# Patient Record
Sex: MALE | URBAN - NONMETROPOLITAN AREA
[De-identification: names, ages, dates, MRNs, and addresses within clinical notes are randomized per-mention and may not be internally consistent; named-entity substitution may affect disease eponyms.]

---

## 2024-12-02 ENCOUNTER — APPOINTMENT (OUTPATIENT)
Dept: CARDIOLOGY | Facility: CLINIC | Age: 69
End: 2024-12-02
Payer: OTHER GOVERNMENT

## 2024-12-02 VITALS — SYSTOLIC BLOOD PRESSURE: 122 MMHG | HEART RATE: 99 BPM | DIASTOLIC BLOOD PRESSURE: 82 MMHG | HEIGHT: 67 IN

## 2024-12-02 DIAGNOSIS — F17.200 CURRENT SMOKER: ICD-10-CM

## 2024-12-02 DIAGNOSIS — I48.21 PERMANENT ATRIAL FIBRILLATION (MULTI): Primary | ICD-10-CM

## 2024-12-02 DIAGNOSIS — R60.9 EDEMA, UNSPECIFIED TYPE: ICD-10-CM

## 2024-12-02 DIAGNOSIS — Z79.01 LONG TERM CURRENT USE OF ANTICOAGULANT THERAPY: ICD-10-CM

## 2024-12-02 DIAGNOSIS — Z78.9 NURSING HOME RESIDENT: ICD-10-CM

## 2024-12-02 DIAGNOSIS — I63.9 CEREBROVASCULAR ACCIDENT (CVA), UNSPECIFIED MECHANISM (MULTI): ICD-10-CM

## 2024-12-02 DIAGNOSIS — E78.2 MIXED HYPERLIPIDEMIA: ICD-10-CM

## 2024-12-02 PROBLEM — B19.20 HEPATITIS C: Status: ACTIVE | Noted: 2024-12-02

## 2024-12-02 PROBLEM — I27.20 PULMONARY HYPERTENSION (MULTI): Status: ACTIVE | Noted: 2024-12-02

## 2024-12-02 PROBLEM — D69.6 THROMBOCYTOPENIA (CMS-HCC): Status: RESOLVED | Noted: 2024-12-02 | Resolved: 2024-12-02

## 2024-12-02 PROBLEM — D69.6 THROMBOCYTOPENIA (CMS-HCC): Status: ACTIVE | Noted: 2024-12-02

## 2024-12-02 PROCEDURE — 93000 ELECTROCARDIOGRAM COMPLETE: CPT | Performed by: INTERNAL MEDICINE

## 2024-12-02 PROCEDURE — 1159F MED LIST DOCD IN RCRD: CPT | Performed by: INTERNAL MEDICINE

## 2024-12-02 PROCEDURE — 99205 OFFICE O/P NEW HI 60 MIN: CPT | Performed by: INTERNAL MEDICINE

## 2024-12-02 RX ORDER — TRIAMTERENE AND HYDROCHLOROTHIAZIDE 37.5; 25 MG/1; MG/1
1 CAPSULE ORAL EVERY MORNING
Qty: 90 CAPSULE | Refills: 2 | Status: SHIPPED | OUTPATIENT
Start: 2024-12-02

## 2024-12-02 RX ORDER — LEVETIRACETAM 500 MG/1
500 TABLET ORAL 2 TIMES DAILY
COMMUNITY

## 2024-12-02 RX ORDER — ATORVASTATIN CALCIUM 40 MG/1
40 TABLET, FILM COATED ORAL DAILY
COMMUNITY

## 2024-12-02 RX ORDER — SENNOSIDES 8.6 MG/1
1 TABLET ORAL DAILY
COMMUNITY

## 2024-12-02 RX ORDER — CHOLECALCIFEROL (VITAMIN D3) 25 MCG
1000 TABLET ORAL DAILY
COMMUNITY

## 2024-12-02 RX ORDER — ASPIRIN 81 MG/1
81 TABLET ORAL DAILY
COMMUNITY

## 2024-12-02 RX ORDER — GABAPENTIN 300 MG/1
300 CAPSULE ORAL 2 TIMES DAILY
COMMUNITY

## 2024-12-02 RX ORDER — DEXTROMETHORPHAN HYDROBROMIDE, GUAIFENESIN 5; 100 MG/5ML; MG/5ML
650 LIQUID ORAL EVERY 8 HOURS PRN
COMMUNITY

## 2024-12-02 RX ORDER — B-COMPLEX WITH VITAMIN C
1 TABLET ORAL DAILY
COMMUNITY

## 2024-12-02 RX ORDER — METOPROLOL SUCCINATE 25 MG/1
25 TABLET, EXTENDED RELEASE ORAL DAILY
COMMUNITY

## 2024-12-02 RX ORDER — SILVER SULFADIAZINE 10 G/1000G
CREAM TOPICAL DAILY
COMMUNITY

## 2024-12-02 NOTE — PATIENT INSTRUCTIONS
Please bring all medicines, vitamins, and herbal supplements with you when you come to the office.    Prescriptions will not be filled unless you are compliant with your follow up appointments or have a follow up appointment scheduled as per instruction of your physician. Refills should be requested at the time of your visit.     Matt discussed  Follow up 6months  Kim  Chem 6

## 2024-12-02 NOTE — PROGRESS NOTES
Cardiology Consultation- New Consult    Reason for referral: Atrial fibrillation and edema    HPI: Jacob Peterson is a 69 y.o. male resident of the [facility] was sent for evaluation for atrial fibrillation and lower extremity edema.  No record was sent but I was able to retrieve at the review of record from [facility].  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  He was diagnosed with atrial fibrillation about a year ago.  He was placed on anticoagulation with Eliquis.  The patient is almost wheelchair-bound.  He had dense right-sided hemiplegia.  He has noted edema but mainly him in the right lower extremity.  He denies chest pain.  He is very inactive.  He is bothered by easy bruisability and superficial bruises.  He denies chest pain or palpitation.  He continues to smoke.  There has been no effort to restore sinus mechanism.  The patient had been in atrial fibrillation for almost a year but this is completely asymptomatic.    Assessment    1.  Permanent atrial fibrillation for more than 1 year based on my review of the record from [facility].  Decision was made in the past for heart rate control and long-term anticoagulation considering he meet the criteria for lifelong anticoagulation  2.  Easy bruisability due to anticoagulation  3.  Edema I suspect gravitational due to lack of activity and a prior history of stroke more pronounced on the right side  4.  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  Wheelchair-bound  6.  Active tobacco use  7.  High risk for ischemic heart disease no previous workup was done  8.  Hyperlipidemia on treatment  9.  Long-term anticoagulation  10.  Echocardiogram at [facility] last year showed normal LV systolic function and moderate degree of pulmonary hypertension which I suspect due to his lung disease and COPD  Plan    1.  I reviewed with patient and his wife treatment option at [facility]  length.  The patient was entertaining the idea of quit taking the Eliquis but I told him that his risk for recurrent stroke exceed 10% and I told him it would be in his best interest either to stay on Eliquis or consider Watchman device.  Following extensive and lengthy discussion the patient elected to remain on Eliquis.  Risk, benefit and alternative reviewed with the patient at length  2.  His previous echocardiogram showed normal LVEF with moderate degree of pulmonary hypertension with PA pressure around 47  3.  I reviewed all his previous record from [facility]  4.  I advised him to try Dyazide 1 tablet daily to address his edema  5.  Advised him to exercise and try to move his extremity and to keep them elevated when he sits down  6.  I advised him to notify with any change in his cardiac status or symptoms  7.  The above reviewed with the patient and his wife at great length      Past Medical History:   As detailed above    Surgical History:   He has a past surgical history that includes XR shoulder.    Family History:   No family history on file.    Social History:   Social History     Tobacco Use    Smoking status: Every Day     Types: Cigarettes    Smokeless tobacco: Never   Substance Use Topics    Alcohol use: Yes        Allergies:  Patient has no known allergies.     Current Medications:    Current Outpatient Medications:     acetaminophen (Tylenol 8 HOUR) 650 mg ER tablet, Take 1 tablet (650 mg) by mouth every 8 hours if needed for mild pain (1 - 3). Do not crush, chew, or split., Disp: , Rfl:     apixaban (Eliquis) 5 mg tablet, Take 1 tablet (5 mg) by mouth 2 times a day., Disp: , Rfl:     aspirin 81 mg EC tablet, Take 1 tablet (81 mg) by mouth once daily., Disp: , Rfl:     atorvastatin (Lipitor) 40 mg tablet, Take 1 tablet (40 mg) by mouth once daily., Disp: , Rfl:     B complex-vitamin C tablet, Take 1 tablet by mouth once daily., Disp: , Rfl:     cholecalciferol (Vitamin D3) 25 MCG (1000  "UT) tablet, Take 1 tablet (1,000 Units) by mouth once daily., Disp: , Rfl:     gabapentin (Neurontin) 300 mg capsule, Take 1 capsule (300 mg) by mouth 2 times a day., Disp: , Rfl:     levETIRAcetam (Keppra) 500 mg tablet, Take 1 tablet (500 mg) by mouth 2 times a day., Disp: , Rfl:     metoprolol succinate XL (Toprol-XL) 25 mg 24 hr tablet, Take 1 tablet (25 mg) by mouth once daily. Do not crush or chew., Disp: , Rfl:     sennosides (Senokot) 8.6 mg tablet, Take 1 tablet (8.6 mg) by mouth once daily., Disp: , Rfl:     silver sulfADIAZINE (Silvadene) 1 % cream, Apply topically once daily., Disp: , Rfl:     triamterene-hydrochlorothiazid (Dyazide) 37.5-25 mg capsule, Take 1 capsule by mouth once daily in the morning., Disp: 90 capsule, Rfl: 2     Vitals:  Vitals:    24 0953 24 0956   BP: 124/72 122/82   BP Location: Left arm Right arm   Patient Position: Sitting Sitting   Pulse: 99    Height: 1.702 m (5' 7\")       EKG done in office today     Review of Systems   All other systems reviewed and are negative.      Objective         Physical Exam  Constitutional:       Appearance: Normal appearance.   HENT:      Nose: Nose normal.   Neck:      Vascular: No carotid bruit.   Cardiovascular:      Rate and Rhythm: Normal rate. Rhythm irregularly irregular.      Pulses: Normal pulses.      Heart sounds: Normal heart sounds.   Pulmonary:      Effort: Pulmonary effort is normal.   Abdominal:      General: Bowel sounds are normal.      Palpations: Abdomen is soft.   Musculoskeletal:         General: Normal range of motion.      Cervical back: Normal range of motion.      Right lower le+ Edema present.      Left lower le+ Edema present.   Skin:     General: Skin is warm and dry.   Neurological:      General: No focal deficit present.      Mental Status: He is alert.   Psychiatric:         Mood and Affect: Mood normal.         Behavior: Behavior normal.         Thought Content: Thought content normal.         " Judgment: Judgment normal.                Assessment and Plan:   1. Permanent atrial fibrillation (Multi)  ECG 12 Lead    Follow Up In Cardiology      2. Long term current use of anticoagulant therapy        3. Edema, unspecified type  triamterene-hydrochlorothiazid (Dyazide) 37.5-25 mg capsule    Basic Metabolic Panel    Basic Metabolic Panel      4. Mixed hyperlipidemia        5. Current smoker        6. Cerebrovascular accident (CVA), unspecified mechanism (Multi)        7. Nursing home resident               Scribe Attestation  By signing my name below, Monica TENORIO LPN, Scribe   attest that this documentation has been prepared under the direction and in the presence of Stephon Prabhakar MD.    Provider Attestation - Scribe documentation    All medical record entries made by the Scribe were at my direction and personally dictated by me. I have reviewed the chart and agree that the record accurately reflects my personal performance of the history, physical exam, discussion and plan.

## 2024-12-02 NOTE — LETTER
December 2, 2024     Nara Xiao DO  [address] [place] [address]    Patient: Jacob Peterson   YOB: 1955   Date of Visit: 12/2/2024       Dear Dr. Nara Xiao DO:    Thank you for referring Jacob Peterson to me for evaluation. Below are my notes for this consultation.  If you have questions, please do not hesitate to call me. I look forward to following your patient along with you.       Sincerely,     Stephon Prabhakar MD      CC: No Recipients  ______________________________________________________________________________________    Cardiology Consultation- New Consult    Reason for referral: Atrial fibrillation and edema    HPI: Jacob Peterson is a 69 y.o. male resident of the [facility] was sent for evaluation for atrial fibrillation and lower extremity edema.  No record was sent but I was able to retrieve at the review of record from [facility].  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  He was diagnosed with atrial fibrillation about a year ago.  He was placed on anticoagulation with Eliquis.  The patient is almost wheelchair-bound.  He had dense right-sided hemiplegia.  He has noted edema but mainly him in the right lower extremity.  He denies chest pain.  He is very inactive.  He is bothered by easy bruisability and superficial bruises.  He denies chest pain or palpitation.  He continues to smoke.  There has been no effort to restore sinus mechanism.  The patient had been in atrial fibrillation for almost a year but this is completely asymptomatic.    Assessment    1.  Permanent atrial fibrillation for more than 1 year based on my review of the record from [facility].  Decision was made in the past for heart rate control and long-term anticoagulation considering he meet the criteria for lifelong anticoagulation  2.  Easy bruisability due to anticoagulation  3.  Edema I suspect gravitational due to  lack of activity and a prior history of stroke more pronounced on the right side  4.  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  Wheelchair-bound  6.  Active tobacco use  7.  High risk for ischemic heart disease no previous workup was done  8.  Hyperlipidemia on treatment  9.  Long-term anticoagulation  10.  Echocardiogram at [facility] last year showed normal LV systolic function and moderate degree of pulmonary hypertension which I suspect due to his lung disease and COPD  Plan    1.  I reviewed with patient and his wife treatment option at great length.  The patient was entertaining the idea of quit taking the Eliquis but I told him that his risk for recurrent stroke exceed 10% and I told him it would be in his best interest either to stay on Eliquis or consider Watchman device.  Following extensive and lengthy discussion the patient elected to remain on Eliquis.  Risk, benefit and alternative reviewed with the patient at length  2.  His previous echocardiogram showed normal LVEF with moderate degree of pulmonary hypertension with PA pressure around 47  3.  I reviewed all his previous record from [facility] online  4.  I advised him to try Dyazide 1 tablet daily to address his edema  5.  Advised him to exercise and try to move his extremity and to keep them elevated when he sits down  6.  I advised him to notify with any change in his cardiac status or symptoms  7.  The above reviewed with the patient and his wife at great length      Past Medical History:   As detailed above    Surgical History:   He has a past surgical history that includes XR shoulder.    Family History:   No family history on file.    Social History:   Social History     Tobacco Use   • Smoking status: Every Day     Types: Cigarettes   • Smokeless tobacco: Never   Substance Use Topics   • Alcohol use: Yes        Allergies:  Patient has no known allergies.     Current Medications:    Current Outpatient Medications:  "  •  acetaminophen (Tylenol 8 HOUR) 650 mg ER tablet, Take 1 tablet (650 mg) by mouth every 8 hours if needed for mild pain (1 - 3). Do not crush, chew, or split., Disp: , Rfl:   •  apixaban (Eliquis) 5 mg tablet, Take 1 tablet (5 mg) by mouth 2 times a day., Disp: , Rfl:   •  aspirin 81 mg EC tablet, Take 1 tablet (81 mg) by mouth once daily., Disp: , Rfl:   •  atorvastatin (Lipitor) 40 mg tablet, Take 1 tablet (40 mg) by mouth once daily., Disp: , Rfl:   •  B complex-vitamin C tablet, Take 1 tablet by mouth once daily., Disp: , Rfl:   •  cholecalciferol (Vitamin D3) 25 MCG (1000 UT) tablet, Take 1 tablet (1,000 Units) by mouth once daily., Disp: , Rfl:   •  gabapentin (Neurontin) 300 mg capsule, Take 1 capsule (300 mg) by mouth 2 times a day., Disp: , Rfl:   •  levETIRAcetam (Keppra) 500 mg tablet, Take 1 tablet (500 mg) by mouth 2 times a day., Disp: , Rfl:   •  metoprolol succinate XL (Toprol-XL) 25 mg 24 hr tablet, Take 1 tablet (25 mg) by mouth once daily. Do not crush or chew., Disp: , Rfl:   •  sennosides (Senokot) 8.6 mg tablet, Take 1 tablet (8.6 mg) by mouth once daily., Disp: , Rfl:   •  silver sulfADIAZINE (Silvadene) 1 % cream, Apply topically once daily., Disp: , Rfl:   •  triamterene-hydrochlorothiazid (Dyazide) 37.5-25 mg capsule, Take 1 capsule by mouth once daily in the morning., Disp: 90 capsule, Rfl: 2     Vitals:  Vitals:    12/02/24 0953 12/02/24 0956   BP: 124/72 122/82   BP Location: Left arm Right arm   Patient Position: Sitting Sitting   Pulse: 99    Height: 1.702 m (5' 7\")       EKG done in office today     Review of Systems   All other systems reviewed and are negative.      Objective        Physical Exam  Constitutional:       Appearance: Normal appearance.   HENT:      Nose: Nose normal.   Neck:      Vascular: No carotid bruit.   Cardiovascular:      Rate and Rhythm: Normal rate. Rhythm irregularly irregular.      Pulses: Normal pulses.      Heart sounds: Normal heart sounds. "   Pulmonary:      Effort: Pulmonary effort is normal.   Abdominal:      General: Bowel sounds are normal.      Palpations: Abdomen is soft.   Musculoskeletal:         General: Normal range of motion.      Cervical back: Normal range of motion.      Right lower le+ Edema present.      Left lower le+ Edema present.   Skin:     General: Skin is warm and dry.   Neurological:      General: No focal deficit present.      Mental Status: He is alert.   Psychiatric:         Mood and Affect: Mood normal.         Behavior: Behavior normal.         Thought Content: Thought content normal.         Judgment: Judgment normal.                Assessment and Plan:   1. Permanent atrial fibrillation (Multi)  ECG 12 Lead    Follow Up In Cardiology      2. Long term current use of anticoagulant therapy        3. Edema, unspecified type  triamterene-hydrochlorothiazid (Dyazide) 37.5-25 mg capsule    Basic Metabolic Panel    Basic Metabolic Panel      4. Mixed hyperlipidemia        5. Current smoker        6. Cerebrovascular accident (CVA), unspecified mechanism (Multi)        7. Nursing home resident               Scribe Attestation  By signing my name below, Monica TENORIO LPN, Scribe   attest that this documentation has been prepared under the direction and in the presence of Stephon Prabhakar MD.    Provider Attestation - Scribe documentation    All medical record entries made by the Scribe were at my direction and personally dictated by me. I have reviewed the chart and agree that the record accurately reflects my personal performance of the history, physical exam, discussion and plan.

## 2025-07-16 ENCOUNTER — TELEPHONE (OUTPATIENT)
Dept: CARDIOLOGY | Facility: CLINIC | Age: 70
End: 2025-07-16
Payer: OTHER GOVERNMENT

## 2025-07-16 NOTE — TELEPHONE ENCOUNTER
[facility] to the [facility] in regards to an authorization for the appointment with Dr. Prabhakar on 7/18/25.  [facility] advised the patient's PCP needed to request the authorization.      Left a message for Dr. Xiao's office to inquire if a referral was able to obtained.

## 2025-07-18 ENCOUNTER — APPOINTMENT (OUTPATIENT)
Dept: CARDIOLOGY | Facility: CLINIC | Age: 70
End: 2025-07-18
Payer: OTHER GOVERNMENT

## 2025-07-18 VITALS
DIASTOLIC BLOOD PRESSURE: 68 MMHG | HEART RATE: 56 BPM | BODY MASS INDEX: 30.61 KG/M2 | WEIGHT: 195 LBS | SYSTOLIC BLOOD PRESSURE: 106 MMHG | HEIGHT: 67 IN

## 2025-07-18 DIAGNOSIS — I48.21 PERMANENT ATRIAL FIBRILLATION (MULTI): Primary | ICD-10-CM

## 2025-07-18 DIAGNOSIS — R60.9 EDEMA, UNSPECIFIED TYPE: ICD-10-CM

## 2025-07-18 DIAGNOSIS — Z78.9 NURSING HOME RESIDENT: ICD-10-CM

## 2025-07-18 DIAGNOSIS — Z79.01 LONG TERM CURRENT USE OF ANTICOAGULANT THERAPY: ICD-10-CM

## 2025-07-18 DIAGNOSIS — I63.9 CEREBROVASCULAR ACCIDENT (CVA), UNSPECIFIED MECHANISM (MULTI): ICD-10-CM

## 2025-07-18 DIAGNOSIS — I27.20 PULMONARY HYPERTENSION (MULTI): ICD-10-CM

## 2025-07-18 DIAGNOSIS — E78.2 MIXED HYPERLIPIDEMIA: ICD-10-CM

## 2025-07-18 DIAGNOSIS — F17.200 CURRENT SMOKER: ICD-10-CM

## 2025-07-18 RX ORDER — POLYETHYLENE GLYCOL 3350 17 G/17G
17 POWDER, FOR SOLUTION ORAL 2 TIMES WEEKLY
COMMUNITY

## 2025-07-18 NOTE — PATIENT INSTRUCTIONS
Please bring all medicines, vitamins, and herbal supplements with you when you come to the office.    Prescriptions will not be filled unless you are compliant with your follow up appointments or have a follow up appointment scheduled as per instruction of your physician. Refills should be requested at the time of your visit.     BMI was above normal measurement. Current weight: 88.5 kg (195 lb)  Weight change since last visit (-) denotes wt loss 195 lbs   Weight loss needed to achieve BMI 25: 35.7 Lbs  Weight loss needed to achieve BMI 30: 3.9 Lbs  Provided instructions on dietary changes.    Watchman discussed  Follow up ordered as needed only   Recommendation to take Eliquis. Patient declines medication

## 2025-07-18 NOTE — PROGRESS NOTES
Chief Complaint   Patient presents with    Follow-up     6m Follow up for Atrial Fibrillation        Subjective   Jacob Peterson is a 69 y.o. male     HPI   Patient is here for follow-up continue management for permanent atrial fibrillation, edema, previous history of stroke and tobacco use.  I saw him as a new consult the last time.  Had a history of atrial fibrillation diagnosed about 2 years ago.  He had dense right-sided hemiplegia.  Last time I discussed with him anticoagulation and he stayed on Eliquis for a while but apparently has not been taking it since June of this year.  The patient at this time is in the office by himself.  He is complaining about easy bruisability.  He remained in wheelchair.  He reports his edema has improved.  He denies lightheadedness, dizziness or syncope.  He continues to have right-sided weakness.    Assessment     1.  Permanent atrial fibrillation for more than 1-2 year based on my review of the record from [facility].  Decision was made in the past for heart rate control and long-term anticoagulation considering he meet the criteria for lifelong anticoagulation.  However the patient refused to take Eliquis over the last month and a half  2.  Easy bruisability due to senile skin and aspirin  3.  Edema I suspect gravitational due to lack of activity and a prior history of stroke more pronounced on the right side improved with low-dose diuretic  4.  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  Wheelchair-bound  6.  Active tobacco use  7.  High risk for ischemic heart disease no previous workup was done  8.  Hyperlipidemia on treatment  9.  patient was on Eliquis but has not taken it since [place]  10.  Echocardiogram at [facility] last year showed normal LV systolic function and moderate degree of pulmonary hypertension which I suspect due to his lung disease and COPD  Plan     1.  I reviewed with patient and his wife treatment option at great length.   "During last office visit at that point of time the patient agreed to stay on Eliquis but now he report he is refusing to take it.  His easy bruisability did not improve much with stopping Eliquis.  I discussed with him treatment option including Watchman device versus going back on Eliquis.  He refused to go back on Eliquis.  [facility] Watchman device  2.  His previous echocardiogram showed normal LVEF with moderate degree of pulmonary hypertension with PA pressure around 47  3.  I reviewed all his recent lab work with him  4.  I offered him referral to our group for Watchman device but he declined.  5.  There is little to add for his care considering his refusal to take medication we will see him as needed basis in the future  Review of Systems   Skin: Negative.         Multiple superficial bruises noted   Neurological:         Right-sided weakness   All other systems reviewed and are negative.           Vitals:    07/18/25 1103   BP: 106/68   BP Location: Left arm   Patient Position: Sitting   Pulse: 56   Weight: 88.5 kg (195 lb)   Height: 1.702 m (5' 7\")        Objective   Physical Exam  Constitutional:       Appearance: Normal appearance.   HENT:      Nose: Nose normal.   Neck:      Vascular: No carotid bruit.     Cardiovascular:      Rate and Rhythm: Normal rate. Rhythm irregularly irregular.      Pulses: Normal pulses.      Heart sounds: Normal heart sounds.   Pulmonary:      Effort: Pulmonary effort is normal.   Abdominal:      General: Bowel sounds are normal.      Palpations: Abdomen is soft.     Musculoskeletal:         General: Normal range of motion.      Cervical back: Normal range of motion.      Right lower leg: No edema.      Left lower leg: No edema.     Skin:     General: Skin is warm and dry.     Neurological:      General: No focal deficit present.      Mental Status: He is alert.     Psychiatric:         Mood and Affect: Mood normal.         Behavior: Behavior " normal.         Thought Content: Thought content normal.         Judgment: Judgment normal.         Allergies  Patient has no known allergies.     Current Medications  Current Outpatient Medications   Medication Instructions    aspirin 81 mg, Daily    atorvastatin (LIPITOR) 40 mg, Daily    B complex-vitamin C tablet 1 tablet, Daily    cholecalciferol (VITAMIN D3) 50 mcg, Daily    gabapentin (NEURONTIN) 600 mg, 2 times daily    levETIRAcetam (KEPPRA) 500 mg, 2 times daily    metoprolol succinate XL (TOPROL-XL) 25 mg, Daily    polyethylene glycol (GLYCOLAX, MIRALAX) 17 g, 2 times weekly    sennosides (Senokot) 8.6 mg tablet 1 tablet, Daily    triamterene-hydrochlorothiazid (Dyazide) 37.5-25 mg capsule 1 capsule, oral, Every morning                        Assessment/Plan   1. Permanent atrial fibrillation (Multi)  Follow Up In Cardiology      2. Pulmonary hypertension (Multi)        3. Mixed hyperlipidemia        4. Long term current use of anticoagulant therapy        5. Cerebrovascular accident (CVA), unspecified mechanism (Multi)        6. Nursing home resident        7. Edema, unspecified type        8. Current smoker                 Scribe Attestation  By signing my name below, Monica TENORIO LPN, Scribe   attest that this documentation has been prepared under the direction and in the presence of Stephon Prabhakar MD.     Provider Attestation - Scribe documentation    All medical record entries made by the Scribe were at my direction and personally dictated by me. I have reviewed the chart and agree that the record accurately reflects my personal performance of the history, physical exam, discussion and plan.

## 2025-07-22 ENCOUNTER — TELEPHONE (OUTPATIENT)
Dept: CARDIOLOGY | Facility: CLINIC | Age: 70
End: 2025-07-22
Payer: OTHER GOVERNMENT

## 2025-07-22 NOTE — TELEPHONE ENCOUNTER
[facility], there is not an auth on file for cardiology office visits on 12/2/24 and 7/18/25.